# Patient Record
Sex: MALE | Race: WHITE | URBAN - NONMETROPOLITAN AREA
[De-identification: names, ages, dates, MRNs, and addresses within clinical notes are randomized per-mention and may not be internally consistent; named-entity substitution may affect disease eponyms.]

---

## 2024-02-27 ENCOUNTER — OV NP (OUTPATIENT)
Dept: URBAN - NONMETROPOLITAN AREA CLINIC 2 | Facility: CLINIC | Age: 29
End: 2024-02-27

## 2024-02-27 ENCOUNTER — LAB (OUTPATIENT)
Dept: URBAN - NONMETROPOLITAN AREA CLINIC 2 | Facility: CLINIC | Age: 29
End: 2024-02-27

## 2024-02-27 VITALS
SYSTOLIC BLOOD PRESSURE: 131 MMHG | HEART RATE: 105 BPM | BODY MASS INDEX: 42.23 KG/M2 | HEIGHT: 70 IN | WEIGHT: 295 LBS | DIASTOLIC BLOOD PRESSURE: 89 MMHG

## 2024-02-27 PROBLEM — 235595009: Status: ACTIVE | Noted: 2024-02-27

## 2024-02-27 RX ORDER — HYOSCYAMINE SULFATE 0.12 MG/1
PLACE 1 TABLET UNDER TONGUE BY TRANSLINGUAL ROUTE EVERY 4 TO 6 HOURS AS NEEDED  SPASM/ABDOMINAL PAIN TABLET, ORALLY DISINTEGRATING ORAL
Qty: 60 | Refills: 11 | Status: ACTIVE | COMMUNITY
Start: 2019-09-24

## 2024-02-27 RX ORDER — ESCITALOPRAM OXALATE 20 MG/1
TABLET ORAL
Qty: 90 TABLET | Status: ACTIVE | COMMUNITY

## 2024-02-27 RX ORDER — ESZOPICLONE 3 MG/1
TABLET, FILM COATED ORAL
Qty: 15 TABLET | Status: ACTIVE | COMMUNITY

## 2024-02-27 RX ORDER — FAMOTIDINE 40 MG/1
1 TABLET AT BEDTIME TABLET, FILM COATED ORAL ONCE A DAY
Qty: 90 TABLET | Refills: 3 | OUTPATIENT
Start: 2024-02-27

## 2024-02-27 RX ORDER — HYOSCYAMINE SULFATE 0.12 MG/1
1 TABLET UNDER THE TONGUE AND ALLOW TO DISSOLVE AS NEEDED TABLET, ORALLY DISINTEGRATING ORAL THREE TIMES A DAY
Qty: 30 | Refills: 3 | OUTPATIENT
Start: 2024-02-27 | End: 2024-04-07

## 2024-02-27 RX ORDER — PANTOPRAZOLE SODIUM 40 MG/1
1 TABLET TABLET, DELAYED RELEASE ORAL ONCE A DAY
Qty: 90 TABLETS | Refills: 3 | OUTPATIENT
Start: 2024-02-27

## 2024-03-14 PROBLEM — 84089009: Status: ACTIVE | Noted: 2024-03-14

## 2024-05-06 ENCOUNTER — LAB OUTSIDE AN ENCOUNTER (OUTPATIENT)
Dept: URBAN - NONMETROPOLITAN AREA CLINIC 2 | Facility: CLINIC | Age: 29
End: 2024-05-06

## 2024-05-06 ENCOUNTER — OFFICE VISIT (OUTPATIENT)
Dept: URBAN - METROPOLITAN AREA MEDICAL CENTER 1 | Facility: MEDICAL CENTER | Age: 29
End: 2024-05-06
Payer: COMMERCIAL

## 2024-05-06 DIAGNOSIS — K20.0 ALLERGIC EOSINOPHILIC ESOPHAGITIS: ICD-10-CM

## 2024-05-06 PROCEDURE — 43239 EGD BIOPSY SINGLE/MULTIPLE: CPT | Performed by: INTERNAL MEDICINE

## 2024-05-06 RX ORDER — FAMOTIDINE 40 MG/1
1 TABLET AT BEDTIME TABLET, FILM COATED ORAL ONCE A DAY
Qty: 90 TABLET | Refills: 3 | Status: ACTIVE | COMMUNITY
Start: 2024-02-27

## 2024-05-06 RX ORDER — ESCITALOPRAM OXALATE 20 MG/1
TABLET ORAL
Qty: 90 TABLET | Status: ACTIVE | COMMUNITY

## 2024-05-06 RX ORDER — ESZOPICLONE 3 MG/1
TABLET, FILM COATED ORAL
Qty: 15 TABLET | Status: ACTIVE | COMMUNITY

## 2024-05-06 RX ORDER — HYOSCYAMINE SULFATE 0.12 MG/1
PLACE 1 TABLET UNDER TONGUE BY TRANSLINGUAL ROUTE EVERY 4 TO 6 HOURS AS NEEDED  SPASM/ABDOMINAL PAIN TABLET, ORALLY DISINTEGRATING ORAL
Qty: 60 | Refills: 11 | Status: ACTIVE | COMMUNITY
Start: 2019-09-24

## 2024-05-06 RX ORDER — PANTOPRAZOLE SODIUM 40 MG/1
1 TABLET TABLET, DELAYED RELEASE ORAL ONCE A DAY
Qty: 90 TABLETS | Refills: 3 | Status: ACTIVE | COMMUNITY
Start: 2024-02-27

## 2024-05-07 ENCOUNTER — TELEPHONE ENCOUNTER (OUTPATIENT)
Dept: URBAN - NONMETROPOLITAN AREA CLINIC 2 | Facility: CLINIC | Age: 29
End: 2024-05-07

## 2024-05-07 LAB
AP CASE REPORT: (no result)
AP FINAL DIAGNOSIS: (no result)
AP GROSS DESCRIPTION: (no result)
AP MICROSCOPIC DESCRIPTION: (no result)

## 2024-05-07 RX ORDER — DUPILUMAB 300 MG/2ML
ONE INJECTION INJECTION, SOLUTION SUBCUTANEOUS WEEKLY
Qty: 4 | Refills: 11 | OUTPATIENT
Start: 2024-05-22 | End: 2025-04-23

## 2024-05-23 ENCOUNTER — TELEPHONE ENCOUNTER (OUTPATIENT)
Dept: URBAN - NONMETROPOLITAN AREA CLINIC 13 | Facility: CLINIC | Age: 29
End: 2024-05-23

## 2024-06-05 ENCOUNTER — TELEPHONE ENCOUNTER (OUTPATIENT)
Dept: URBAN - NONMETROPOLITAN AREA CLINIC 13 | Facility: CLINIC | Age: 29
End: 2024-06-05

## 2024-06-11 ENCOUNTER — TELEPHONE ENCOUNTER (OUTPATIENT)
Dept: URBAN - METROPOLITAN AREA CLINIC 80 | Facility: CLINIC | Age: 29
End: 2024-06-11

## 2024-06-17 ENCOUNTER — DASHBOARD ENCOUNTERS (OUTPATIENT)
Age: 29
End: 2024-06-17

## 2024-06-17 ENCOUNTER — OFFICE VISIT (OUTPATIENT)
Dept: URBAN - NONMETROPOLITAN AREA CLINIC 2 | Facility: CLINIC | Age: 29
End: 2024-06-17
Payer: COMMERCIAL

## 2024-06-17 VITALS
HEART RATE: 93 BPM | SYSTOLIC BLOOD PRESSURE: 144 MMHG | WEIGHT: 295 LBS | BODY MASS INDEX: 42.23 KG/M2 | HEIGHT: 70 IN | DIASTOLIC BLOOD PRESSURE: 100 MMHG

## 2024-06-17 DIAGNOSIS — K20.0 EOSINOPHILIC ESOPHAGITIS: ICD-10-CM

## 2024-06-17 DIAGNOSIS — K21.9 GASTROESOPHAGEAL REFLUX DISEASE, UNSPECIFIED WHETHER ESOPHAGITIS PRESENT: ICD-10-CM

## 2024-06-17 PROBLEM — 235599003: Status: ACTIVE | Noted: 2024-06-17

## 2024-06-17 PROCEDURE — 99214 OFFICE O/P EST MOD 30 MIN: CPT | Performed by: NURSE PRACTITIONER

## 2024-06-17 RX ORDER — PANTOPRAZOLE SODIUM 40 MG/1
1 TABLET TABLET, DELAYED RELEASE ORAL ONCE A DAY
Qty: 90 TABLETS | Refills: 3 | OUTPATIENT

## 2024-06-17 RX ORDER — ESZOPICLONE 3 MG/1
TABLET, FILM COATED ORAL
Qty: 15 TABLET | Status: ACTIVE | COMMUNITY

## 2024-06-17 RX ORDER — FAMOTIDINE 40 MG/1
1 TABLET AT BEDTIME TABLET, FILM COATED ORAL ONCE A DAY
Qty: 90 TABLET | Refills: 3 | Status: ACTIVE | COMMUNITY
Start: 2024-02-27

## 2024-06-17 RX ORDER — PANTOPRAZOLE SODIUM 40 MG/1
1 TABLET TABLET, DELAYED RELEASE ORAL ONCE A DAY
Qty: 90 TABLETS | Refills: 3 | Status: ACTIVE | COMMUNITY
Start: 2024-02-27

## 2024-06-17 RX ORDER — DUPILUMAB 300 MG/2ML
ONE INJECTION INJECTION, SOLUTION SUBCUTANEOUS WEEKLY
Qty: 4 | Refills: 11 | Status: ACTIVE | COMMUNITY
Start: 2024-05-22 | End: 2025-04-23

## 2024-06-17 RX ORDER — HYOSCYAMINE SULFATE 0.12 MG/1
PLACE 1 TABLET UNDER TONGUE BY TRANSLINGUAL ROUTE EVERY 4 TO 6 HOURS AS NEEDED  SPASM/ABDOMINAL PAIN TABLET, ORALLY DISINTEGRATING ORAL
Qty: 60 | Refills: 11 | Status: ACTIVE | COMMUNITY
Start: 2019-09-24

## 2024-06-17 RX ORDER — ESCITALOPRAM OXALATE 20 MG/1
TABLET ORAL
Qty: 90 TABLET | Status: ACTIVE | COMMUNITY

## 2024-06-17 RX ORDER — FAMOTIDINE 40 MG/1
1 TABLET AFTER BREAKFAST AND AT BEDTIME TABLET, FILM COATED ORAL TWICE DAILY
Qty: 60 | Refills: 3 | OUTPATIENT

## 2024-06-17 NOTE — PHYSICAL EXAM EYES:
Conjuntivae and eyelids appear normal,  Sclerae : White without injection  no s/s bleeding, pt weight accurate with drug dosing weight no s/s bleeding no s/s bleeding

## 2024-06-17 NOTE — HPI-TODAY'S VISIT:
Mr. Jonathan Stauffer is a 28-year-old male who presents today for follow-up of chest discomfort.  Since his last visit he underwent EGD where eosinophilic esophagitis was documented on pathology.  He had up to 19 eosinophils per high-powered field.  No evidence of gastritis, esophagitis, or hiatal hernia were noted.  Unfortunately Jonathan denied any improvement of his chest pain despite increasing his PPI to 40 mg.  He did feel that famotidine helped his nighttime symptoms.  Jonathan notes that he vomits around once per week before dinner and it usually his lunch time meal.  He believes he had a gastric emptying study a few years ago that was reportedly normal.  Gastroparesis diet information provided to the patient as he defers GES at this time.  Dupixent was also denied by his insurance we will trial budesonide for treatment of EOE.  LG.

## 2024-06-17 NOTE — HPI-OTHER HISTORIES
2/27/2024 Jonathan presents to clinic for evaluation of GERD referred by Dr. Jelena Howe. A copy of this note and recommendations will be forwarded to her office. He previously underwent EGD with Dr. Amador Gallardo in October 2018 found to have a small hiatal hernia, nonsevere esophagitis and gastritis. His pathology at that time resulted with no infection and benign inflammation. Since then he continued with manage symptoms. However due to recently worsening symptoms he would like repeat evaluation. He is having breakthrough reflux symptoms despite use of his PPI. He is concerned that his hiatal hernia may have worsened. His symptoms are worse in the evenings. At times he has a squeezing chest pain that is alarming. Antacids do not improve this. He has had a negative cardiac workup. He would like to increase treatment for GERD and have a repeat evaluation with upper endoscopy. He denies heavy use of NSAIDs. He denies difficulty swallowing and has not lost weight. Today we discussed scheduling repeat endoscopy as well as starting famotidine 40 mg at bedtime as well as pantoprazole 40 mg in the morning. He will also trial Levsin for possible esophageal spasm. SP  EGD 5/6/2024 - up to 19 eosinophils/HPF; Dr. Merida requested dupixent, but denied because the plan requires failure or intolerance to a corticosteriod.

## 2024-07-30 ENCOUNTER — TELEPHONE ENCOUNTER (OUTPATIENT)
Dept: URBAN - NONMETROPOLITAN AREA CLINIC 2 | Facility: CLINIC | Age: 29
End: 2024-07-30

## 2024-07-31 ENCOUNTER — LAB OUTSIDE AN ENCOUNTER (OUTPATIENT)
Dept: URBAN - NONMETROPOLITAN AREA CLINIC 2 | Facility: CLINIC | Age: 29
End: 2024-07-31

## 2024-08-03 ENCOUNTER — LAB OUTSIDE AN ENCOUNTER (OUTPATIENT)
Dept: URBAN - NONMETROPOLITAN AREA CLINIC 2 | Facility: CLINIC | Age: 29
End: 2024-08-03

## 2024-08-07 LAB
ADENOVIRUS F 40/41: NOT DETECTED
CAMPYLOBACTER: NOT DETECTED
CLOSTRIDIUM DIFFICILE: NOT DETECTED
ENTAMOEBA HISTOLYTICA: NOT DETECTED
ENTEROAGGREGATIVE E.COLI: NOT DETECTED
ENTEROTOXIGENIC E.COLI: NOT DETECTED
ESCHERICHIA COLI O157: NOT DETECTED
GIARDIA LAMBLIA: NOT DETECTED
NOROVIRUS GI/GII: NOT DETECTED
PANCREATICELASTASE ELISA, STOOL: (no result)
ROTAVIRUS A: NOT DETECTED
SALMONELLA SPP.: NOT DETECTED
SHIGA-LIKE TOXIN PRODUCING E.COLI: NOT DETECTED
SHIGELLA SPP. / ENTEROINVASIVE E.COLI: NOT DETECTED
VIBRIO PARAHAEMOLYTICUS: NOT DETECTED
VIBRIO SPP.: NOT DETECTED
YERSINIA ENTEROCOLITICA: NOT DETECTED

## 2024-08-09 ENCOUNTER — TELEPHONE ENCOUNTER (OUTPATIENT)
Dept: URBAN - NONMETROPOLITAN AREA CLINIC 2 | Facility: CLINIC | Age: 29
End: 2024-08-09

## 2024-08-09 ENCOUNTER — LAB OUTSIDE AN ENCOUNTER (OUTPATIENT)
Dept: URBAN - NONMETROPOLITAN AREA CLINIC 2 | Facility: CLINIC | Age: 29
End: 2024-08-09

## 2024-08-12 ENCOUNTER — OFFICE VISIT (OUTPATIENT)
Dept: URBAN - NONMETROPOLITAN AREA CLINIC 2 | Facility: CLINIC | Age: 29
End: 2024-08-12
Payer: COMMERCIAL

## 2024-08-12 VITALS
BODY MASS INDEX: 43.23 KG/M2 | HEIGHT: 70 IN | SYSTOLIC BLOOD PRESSURE: 120 MMHG | HEART RATE: 80 BPM | DIASTOLIC BLOOD PRESSURE: 84 MMHG | WEIGHT: 302 LBS

## 2024-08-12 DIAGNOSIS — D50.8 OTHER IRON DEFICIENCY ANEMIAS: ICD-10-CM

## 2024-08-12 DIAGNOSIS — K21.9 GASTROESOPHAGEAL REFLUX DISEASE, UNSPECIFIED WHETHER ESOPHAGITIS PRESENT: ICD-10-CM

## 2024-08-12 DIAGNOSIS — R10.30 LOWER ABDOMINAL PAIN: ICD-10-CM

## 2024-08-12 PROBLEM — 87522002: Status: ACTIVE | Noted: 2024-08-09

## 2024-08-12 PROCEDURE — 99214 OFFICE O/P EST MOD 30 MIN: CPT | Performed by: NURSE PRACTITIONER

## 2024-08-12 RX ORDER — PANTOPRAZOLE SODIUM 40 MG/1
1 TABLET TABLET, DELAYED RELEASE ORAL ONCE A DAY
Qty: 90 TABLETS | Refills: 3 | Status: ACTIVE | COMMUNITY

## 2024-08-12 RX ORDER — DICYCLOMINE HYDROCHLORIDE 20 MG/1
1 TABLET TABLET ORAL THREE TIMES A DAY
Qty: 90 | OUTPATIENT
Start: 2024-08-12 | End: 2024-09-11

## 2024-08-12 RX ORDER — DUPILUMAB 300 MG/2ML
ONE INJECTION INJECTION, SOLUTION SUBCUTANEOUS WEEKLY
Qty: 4 | Refills: 11 | Status: ACTIVE | COMMUNITY
Start: 2024-05-22 | End: 2025-04-23

## 2024-08-12 RX ORDER — FAMOTIDINE 40 MG/1
1 TABLET AFTER BREAKFAST AND AT BEDTIME TABLET, FILM COATED ORAL TWICE DAILY
Qty: 60 | Refills: 3 | Status: ACTIVE | COMMUNITY

## 2024-08-12 RX ORDER — PANTOPRAZOLE SODIUM 40 MG/1
1 TABLET TABLET, DELAYED RELEASE ORAL ONCE A DAY
Qty: 90 TABLETS | Refills: 3 | OUTPATIENT

## 2024-08-12 RX ORDER — FAMOTIDINE 40 MG/1
1 TABLET AFTER BREAKFAST AND AT BEDTIME TABLET, FILM COATED ORAL TWICE DAILY
Qty: 60 | Refills: 3 | OUTPATIENT

## 2024-08-12 RX ORDER — HYOSCYAMINE SULFATE 0.12 MG/1
PLACE 1 TABLET UNDER TONGUE BY TRANSLINGUAL ROUTE EVERY 4 TO 6 HOURS AS NEEDED  SPASM/ABDOMINAL PAIN TABLET, ORALLY DISINTEGRATING ORAL
Qty: 60 | Refills: 11 | Status: ACTIVE | COMMUNITY
Start: 2019-09-24

## 2024-08-12 RX ORDER — ESCITALOPRAM OXALATE 20 MG/1
TABLET ORAL
Qty: 90 TABLET | Status: ACTIVE | COMMUNITY

## 2024-08-12 RX ORDER — ESZOPICLONE 3 MG/1
TABLET, FILM COATED ORAL
Qty: 15 TABLET | Status: ACTIVE | COMMUNITY

## 2024-08-12 NOTE — HPI-OTHER HISTORIES
2/27/2024 Jonathan presents to clinic for evaluation of GERD referred by Dr. Jelena Howe. A copy of this note and recommendations will be forwarded to her office. He previously underwent EGD with Dr. Amador Gallardo in October 2018 found to have a small hiatal hernia, nonsevere esophagitis and gastritis. His pathology at that time resulted with no infection and benign inflammation. Since then he continued with manage symptoms. However due to recently worsening symptoms he would like repeat evaluation. He is having breakthrough reflux symptoms despite use of his PPI. He is concerned that his hiatal hernia may have worsened. His symptoms are worse in the evenings. At times he has a squeezing chest pain that is alarming. Antacids do not improve this. He has had a negative cardiac workup. He would like to increase treatment for GERD and have a repeat evaluation with upper endoscopy. He denies heavy use of NSAIDs. He denies difficulty swallowing and has not lost weight. Today we discussed scheduling repeat endoscopy as well as starting famotidine 40 mg at bedtime as well as pantoprazole 40 mg in the morning. He will also trial Levsin for possible esophageal spasm. SP  EGD 5/6/2024 - up to 19 eosinophils/HPF; Dr. Merida requested dupixent, but denied because the plan requires failure or intolerance to a corticosteriod.  6/17/2024: Mr. Jonathan Stauffer is a 28-year-old male who presents today for follow-up of chest discomfort. Since his last visit he underwent EGD where eosinophilic esophagitis was documented on pathology. He had up to 19 eosinophils per high-powered field. No evidence of gastritis, esophagitis, or hiatal hernia were noted. Unfortunately Jonathan denied any improvement of his chest pain despite increasing his PPI to 40 mg. He did feel that famotidine helped his nighttime symptoms. Jonathan notes that he vomits around once per week before dinner and it usually his lunch time meal. He believes he had a gastric emptying study a few years ago that was reportedly normal. Gastroparesis diet information provided to the patient as he defers GES at this time. Dupixent was also denied by his insurance we will trial budesonide for treatment of EOE. LG.

## 2024-08-12 NOTE — PHYSICAL EXAM HENT:
Head,  normocephalic,  atraumatic,  Face,  Face within normal limits,  Ears,  External ears within normal limits,  Nose/Nasopharynx,  External nose  normal appearance,  nares patent, no

## 2024-08-12 NOTE — HPI-TODAY'S VISIT:
Jonathan Stauffer returns for follow-up of chest discomfort.  Since losing weight and remaining on Protonix and famotidine his chest pain has significantly improved.  Unfortunately he developed a viral illness around 6 weeks ago and has had loose stools and abdominal cramping since.  He underwent a colonoscopy last week with reportedly normal findings.  GPP was also normal.  He denies any blood in his stool.  Labs were drawn showing mild iron deficiency anemia.  No overt bleeding noted.  LG.

## 2024-08-27 ENCOUNTER — OFFICE VISIT (OUTPATIENT)
Dept: URBAN - NONMETROPOLITAN AREA SURGERY CENTER 1 | Facility: SURGERY CENTER | Age: 29
End: 2024-08-27

## 2024-10-07 ENCOUNTER — OFFICE VISIT (OUTPATIENT)
Dept: URBAN - METROPOLITAN AREA MEDICAL CENTER 1 | Facility: MEDICAL CENTER | Age: 29
End: 2024-10-07

## 2024-10-21 ENCOUNTER — OFFICE VISIT (OUTPATIENT)
Dept: URBAN - NONMETROPOLITAN AREA CLINIC 2 | Facility: CLINIC | Age: 29
End: 2024-10-21

## 2024-12-09 ENCOUNTER — TELEPHONE ENCOUNTER (OUTPATIENT)
Dept: URBAN - NONMETROPOLITAN AREA CLINIC 2 | Facility: CLINIC | Age: 29
End: 2024-12-09